# Patient Record
Sex: MALE | Race: WHITE | NOT HISPANIC OR LATINO
[De-identification: names, ages, dates, MRNs, and addresses within clinical notes are randomized per-mention and may not be internally consistent; named-entity substitution may affect disease eponyms.]

---

## 2019-02-22 PROCEDURE — 93010 ELECTROCARDIOGRAM REPORT: CPT

## 2019-02-28 ENCOUNTER — CHART COPY (OUTPATIENT)
Age: 54
End: 2019-02-28

## 2019-02-28 PROBLEM — Z00.00 ENCOUNTER FOR PREVENTIVE HEALTH EXAMINATION: Status: ACTIVE | Noted: 2019-02-28

## 2019-03-12 ENCOUNTER — APPOINTMENT (OUTPATIENT)
Dept: CARDIOLOGY | Facility: CLINIC | Age: 54
End: 2019-03-12
Payer: COMMERCIAL

## 2019-03-12 VITALS
HEIGHT: 78 IN | BODY MASS INDEX: 25.22 KG/M2 | HEART RATE: 76 BPM | DIASTOLIC BLOOD PRESSURE: 80 MMHG | SYSTOLIC BLOOD PRESSURE: 106 MMHG | WEIGHT: 218 LBS

## 2019-03-12 DIAGNOSIS — R42 DIZZINESS AND GIDDINESS: ICD-10-CM

## 2019-03-12 DIAGNOSIS — E78.5 HYPERLIPIDEMIA, UNSPECIFIED: ICD-10-CM

## 2019-03-12 DIAGNOSIS — R07.9 CHEST PAIN, UNSPECIFIED: ICD-10-CM

## 2019-03-12 PROCEDURE — 99204 OFFICE O/P NEW MOD 45 MIN: CPT

## 2019-03-12 PROCEDURE — 93000 ELECTROCARDIOGRAM COMPLETE: CPT

## 2019-03-12 NOTE — HISTORY OF PRESENT ILLNESS
[FreeTextEntry1] : Referred by Dr. Edmond  - emergency room\par Primary care physician Dr. Grajeda\par \par Mr. BRIDGET SIGALA  is a 53 year year old M  who presents with complaints of non radiating substernal chest pain. Pain appears to be exertional and lasts for a fraction of a second. Other associated symptoms include dizziness. His cardiac risk factors include hyperlipidemia and significant family history of coronary artery disease in father.  Patient had severe episode of chest discomfort while he was sitting in his car and went to [facility] where he was ruled out for acute coronary syndrome and has been referred for further management. Patient has recently started Lipitor for hyperlipidemia.\par \par Pt denies symptoms of shortness of breath, lightheadedness, syncope, claudication, orthopnea, PND, or edema. Pt denies chest pain to be reproducible by palpation and has no temporal sequence to food.\par Patient's past medical history includes hyperlipidemia but denies  hypertension and diabetes.\par Patient denies history of MI, stroke or TIA, diabetes, peripheral vascular disease, aortic aneurysm or renal impairment. \par Resting EKG revealed normal sinus rhythm with nonspecific ST and T wave change. \par

## 2019-03-12 NOTE — ASSESSMENT
[FreeTextEntry1] : R. live hyperlipidemia and strong family history of coronary artery disease who presents with episodes of chest pain. Pain may be exertional intermittently.\par Patient has a history of GERD however this chest pain was different and patient also has a history of potential García's esophagus.\par Recommend GI workup for endoscopy\par Echocardiogram to evaluate low voltage as well as chest pain\par Nuclear stress test\par \par Carotid u/s for dizziness. \par Patient may continue Lipitor and repeat lipid panel in 6 weeks.

## 2019-03-12 NOTE — PHYSICAL EXAM
[General Appearance - Well Developed] : well developed [Normal Appearance] : normal appearance [Well Groomed] : well groomed [General Appearance - Well Nourished] : well nourished [No Deformities] : no deformities [General Appearance - In No Acute Distress] : no acute distress [Normal Conjunctiva] : the conjunctiva exhibited no abnormalities [Eyelids - No Xanthelasma] : the eyelids demonstrated no xanthelasmas [Normal Oral Mucosa] : normal oral mucosa [No Oral Pallor] : no oral pallor [No Oral Cyanosis] : no oral cyanosis [Normal Jugular Venous A Waves Present] : normal jugular venous A waves present [Normal Jugular Venous V Waves Present] : normal jugular venous V waves present [No Jugular Venous Breen A Waves] : no jugular venous breen A waves [Heart Rate And Rhythm] : heart rate and rhythm were normal [Heart Sounds] : normal S1 and S2 [Murmurs] : no murmurs present [Respiration, Rhythm And Depth] : normal respiratory rhythm and effort [Exaggerated Use Of Accessory Muscles For Inspiration] : no accessory muscle use [Auscultation Breath Sounds / Voice Sounds] : lungs were clear to auscultation bilaterally [Abdomen Soft] : soft [Abdomen Tenderness] : non-tender [Abdomen Mass (___ Cm)] : no abdominal mass palpated [Abnormal Walk] : normal gait [Gait - Sufficient For Exercise Testing] : the gait was sufficient for exercise testing [Nail Clubbing] : no clubbing of the fingernails [Cyanosis, Localized] : no localized cyanosis [Petechial Hemorrhages (___cm)] : no petechial hemorrhages [Skin Color & Pigmentation] : normal skin color and pigmentation [] : no rash [No Venous Stasis] : no venous stasis [Skin Lesions] : no skin lesions [No Skin Ulcers] : no skin ulcer [No Xanthoma] : no  xanthoma was observed [Oriented To Time, Place, And Person] : oriented to person, place, and time [Affect] : the affect was normal [Mood] : the mood was normal [No Anxiety] : not feeling anxious

## 2019-04-18 ENCOUNTER — RESULT REVIEW (OUTPATIENT)
Age: 54
End: 2019-04-18